# Patient Record
Sex: FEMALE | Race: WHITE | URBAN - METROPOLITAN AREA
[De-identification: names, ages, dates, MRNs, and addresses within clinical notes are randomized per-mention and may not be internally consistent; named-entity substitution may affect disease eponyms.]

---

## 2019-08-22 ENCOUNTER — OFFICE VISIT (OUTPATIENT)
Dept: URBAN - METROPOLITAN AREA CLINIC 33 | Facility: CLINIC | Age: 54
End: 2019-08-22
Payer: OTHER GOVERNMENT

## 2019-08-22 DIAGNOSIS — H52.03 HYPERMETROPIA, BILATERAL: Primary | ICD-10-CM

## 2019-08-22 PROCEDURE — 92004 COMPRE OPH EXAM NEW PT 1/>: CPT | Performed by: OPTOMETRIST

## 2019-08-22 PROCEDURE — 92310 CONTACT LENS FITTING OU: CPT | Performed by: OPTOMETRIST

## 2019-08-22 ASSESSMENT — INTRAOCULAR PRESSURE
OD: 14
OS: 13

## 2019-08-22 ASSESSMENT — KERATOMETRY
OD: 44.50
OS: 43.63

## 2019-08-22 ASSESSMENT — VISUAL ACUITY
OD: 20/20
OS: 20/20

## 2019-08-22 NOTE — IMPRESSION/PLAN
Impression: Hypermetropia, bilateral: H52.03. Plan: Educated patient about today's findings. Order refraction to determine patient's best corrected visual acuity as it relates to hypermetropia- dispensed Rx to patient. Patient was educated about 1-2 week adaptation period with new Rx. RTC for CL fit and prism correction glasses over top of contacts to wear for driving and/or TV when symptomatic for depth perception problems.

## 2019-09-05 ENCOUNTER — TESTING ONLY (OUTPATIENT)
Dept: URBAN - METROPOLITAN AREA CLINIC 33 | Facility: CLINIC | Age: 54
End: 2019-09-05

## 2019-09-05 PROCEDURE — 92310 CONTACT LENS FITTING OU: CPT | Performed by: OPTOMETRIST
